# Patient Record
Sex: MALE | Race: WHITE | NOT HISPANIC OR LATINO | Employment: FULL TIME | URBAN - METROPOLITAN AREA
[De-identification: names, ages, dates, MRNs, and addresses within clinical notes are randomized per-mention and may not be internally consistent; named-entity substitution may affect disease eponyms.]

---

## 2018-11-13 ENCOUNTER — DOCUMENTATION ONLY (OUTPATIENT)
Dept: PEDIATRIC HEMATOLOGY/ONCOLOGY | Facility: CLINIC | Age: 28
End: 2018-11-13

## 2018-11-19 ENCOUNTER — DOCUMENTATION ONLY (OUTPATIENT)
Dept: PEDIATRIC HEMATOLOGY/ONCOLOGY | Facility: CLINIC | Age: 28
End: 2018-11-19

## 2019-01-02 ENCOUNTER — LAB VISIT (OUTPATIENT)
Dept: LAB | Facility: HOSPITAL | Age: 29
End: 2019-01-02
Attending: PEDIATRICS
Payer: COMMERCIAL

## 2019-01-02 ENCOUNTER — OFFICE VISIT (OUTPATIENT)
Dept: PEDIATRIC HEMATOLOGY/ONCOLOGY | Facility: CLINIC | Age: 29
End: 2019-01-02
Payer: COMMERCIAL

## 2019-01-02 VITALS
DIASTOLIC BLOOD PRESSURE: 71 MMHG | SYSTOLIC BLOOD PRESSURE: 116 MMHG | HEIGHT: 71 IN | RESPIRATION RATE: 18 BRPM | HEART RATE: 75 BPM | BODY MASS INDEX: 25.65 KG/M2 | TEMPERATURE: 98 F | WEIGHT: 183.19 LBS

## 2019-01-02 DIAGNOSIS — Z71.3 NUTRITIONAL COUNSELING: ICD-10-CM

## 2019-01-02 DIAGNOSIS — Z85.9 HISTORY OF CANCER: ICD-10-CM

## 2019-01-02 DIAGNOSIS — Z92.21 HISTORY OF CHEMOTHERAPY: ICD-10-CM

## 2019-01-02 DIAGNOSIS — Z92.21 HISTORY OF CHEMOTHERAPY: Primary | ICD-10-CM

## 2019-01-02 LAB
ALBUMIN SERPL BCP-MCNC: 4.4 G/DL
ALBUMIN/CREAT UR: 13.5 UG/MG
ALP SERPL-CCNC: 96 U/L
ALT SERPL W/O P-5'-P-CCNC: 24 U/L
AMORPH CRY UR QL COMP ASSIST: NORMAL
ANION GAP SERPL CALC-SCNC: 7 MMOL/L
AST SERPL-CCNC: 60 U/L
BASOPHILS # BLD AUTO: 0.02 K/UL
BASOPHILS NFR BLD: 0.3 %
BILIRUB SERPL-MCNC: 0.5 MG/DL
BILIRUB UR QL STRIP: NEGATIVE
BUN SERPL-MCNC: 19 MG/DL
CALCIUM SERPL-MCNC: 9.5 MG/DL
CHLORIDE SERPL-SCNC: 104 MMOL/L
CHOLEST SERPL-MCNC: 249 MG/DL
CHOLEST/HDLC SERPL: 4.8 {RATIO}
CLARITY UR REFRACT.AUTO: ABNORMAL
CO2 SERPL-SCNC: 28 MMOL/L
COLOR UR AUTO: YELLOW
CREAT SERPL-MCNC: 1.1 MG/DL
CREAT UR-MCNC: 208 MG/DL
DIFFERENTIAL METHOD: NORMAL
EOSINOPHIL # BLD AUTO: 0.1 K/UL
EOSINOPHIL NFR BLD: 0.9 %
ERYTHROCYTE [DISTWIDTH] IN BLOOD BY AUTOMATED COUNT: 13.2 %
EST. GFR  (AFRICAN AMERICAN): >60 ML/MIN/1.73 M^2
EST. GFR  (NON AFRICAN AMERICAN): >60 ML/MIN/1.73 M^2
ESTIMATED AVG GLUCOSE: 91 MG/DL
GLUCOSE SERPL-MCNC: 85 MG/DL
GLUCOSE UR QL STRIP: NEGATIVE
HBA1C MFR BLD HPLC: 4.8 %
HCT VFR BLD AUTO: 43.1 %
HDLC SERPL-MCNC: 52 MG/DL
HDLC SERPL: 20.9 %
HGB BLD-MCNC: 14.6 G/DL
HGB UR QL STRIP: NEGATIVE
KETONES UR QL STRIP: NEGATIVE
LDLC SERPL CALC-MCNC: 131.6 MG/DL
LEUKOCYTE ESTERASE UR QL STRIP: NEGATIVE
LYMPHOCYTES # BLD AUTO: 1.6 K/UL
LYMPHOCYTES NFR BLD: 20.5 %
MCH RBC QN AUTO: 30.9 PG
MCHC RBC AUTO-ENTMCNC: 33.9 G/DL
MCV RBC AUTO: 91 FL
MICROALBUMIN UR DL<=1MG/L-MCNC: 28 UG/ML
MICROSCOPIC COMMENT: NORMAL
MONOCYTES # BLD AUTO: 0.6 K/UL
MONOCYTES NFR BLD: 7.3 %
NEUTROPHILS # BLD AUTO: 5.4 K/UL
NEUTROPHILS NFR BLD: 71 %
NITRITE UR QL STRIP: NEGATIVE
NONHDLC SERPL-MCNC: 197 MG/DL
PH UR STRIP: 5 [PH] (ref 5–8)
PLATELET # BLD AUTO: 235 K/UL
PMV BLD AUTO: 11.7 FL
POTASSIUM SERPL-SCNC: 4.1 MMOL/L
PROT SERPL-MCNC: 8.1 G/DL
PROT UR QL STRIP: NEGATIVE
RBC # BLD AUTO: 4.72 M/UL
SODIUM SERPL-SCNC: 139 MMOL/L
SP GR UR STRIP: 1.02 (ref 1–1.03)
TRIGL SERPL-MCNC: 327 MG/DL
TSH SERPL DL<=0.005 MIU/L-ACNC: 1.71 UIU/ML
URN SPEC COLLECT METH UR: ABNORMAL
WBC # BLD AUTO: 7.65 K/UL

## 2019-01-02 PROCEDURE — 99499 UNLISTED E&M SERVICE: CPT | Mod: S$GLB,,, | Performed by: PEDIATRICS

## 2019-01-02 PROCEDURE — 99499 NO LOS: ICD-10-PCS | Mod: S$GLB,,, | Performed by: PEDIATRICS

## 2019-01-02 PROCEDURE — 99999 PR PBB SHADOW E&M-EST. PATIENT-LVL III: ICD-10-PCS | Mod: PBBFAC,,,

## 2019-01-02 PROCEDURE — 99203 OFFICE O/P NEW LOW 30 MIN: CPT | Mod: S$GLB,,, | Performed by: PEDIATRICS

## 2019-01-02 PROCEDURE — 82043 UR ALBUMIN QUANTITATIVE: CPT

## 2019-01-02 PROCEDURE — 80053 COMPREHEN METABOLIC PANEL: CPT

## 2019-01-02 PROCEDURE — 85025 COMPLETE CBC W/AUTO DIFF WBC: CPT | Mod: PO

## 2019-01-02 PROCEDURE — 99203 PR OFFICE/OUTPT VISIT, NEW, LEVL III, 30-44 MIN: ICD-10-PCS | Mod: S$GLB,,, | Performed by: PEDIATRICS

## 2019-01-02 PROCEDURE — 81001 URINALYSIS AUTO W/SCOPE: CPT

## 2019-01-02 PROCEDURE — 36415 COLL VENOUS BLD VENIPUNCTURE: CPT | Mod: PO

## 2019-01-02 PROCEDURE — 83036 HEMOGLOBIN GLYCOSYLATED A1C: CPT

## 2019-01-02 PROCEDURE — 99999 PR PBB SHADOW E&M-EST. PATIENT-LVL III: CPT | Mod: PBBFAC,,,

## 2019-01-02 PROCEDURE — 97802 PR MED NUTR THER, 1ST, INDIV, EA 15 MIN: ICD-10-PCS | Mod: S$GLB,,, | Performed by: DIETITIAN, REGISTERED

## 2019-01-02 PROCEDURE — 80061 LIPID PANEL: CPT

## 2019-01-02 PROCEDURE — 86703 HIV-1/HIV-2 1 RESULT ANTBDY: CPT

## 2019-01-02 PROCEDURE — 97802 MEDICAL NUTRITION INDIV IN: CPT | Mod: S$GLB,,, | Performed by: DIETITIAN, REGISTERED

## 2019-01-02 PROCEDURE — 84443 ASSAY THYROID STIM HORMONE: CPT

## 2019-01-02 NOTE — PROGRESS NOTES
"Referring Physician:No ref. provider found     Reason for visit:  Chief Complaint   Patient presents with    Nutrition Counseling        :1990     Allergies Reviewed  Meds Reviewed    Anthropometrics  Weight:81.6 kg (180 lb)  Height:5' 11" (1.803 m)  BMI:Body mass index is 25.1 kg/m².   IBW: 172#    Meds:  Outpatient Medications Prior to Visit   Medication Sig Dispense Refill    sertraline (ZOLOFT) 100 MG tablet Take 100 mg by mouth once daily.      hyoscyamine (LEVSIN/SL) 0.125 mg Subl Take 1 tablet (0.125 mg total) by mouth every 4 (four) hours as needed. Take before meals 60 tablet 6     No facility-administered medications prior to visit.          Labs: Labs ordered for today     Estimated Nutrition Needs: 2000 Kcals/day (25 kcal/kg), 80 g protein (1.0 g/kg)     Diet Hx: Pt here for nutrition counseling in [place]. Current weight of 180# which is stable. Pt expresses desire to reach 175#. Good appetite; eats 3 meals/day with no snacks. Most meals mainly consist of some form of protein/meat. Pt likes fruits/vegetables but rarely eats them. Pt currently in law school; lives with otilio in [facility]. Picks up food at most meals; cooks at home about 3 nights/week. Complains of diarrhea occasionally, especially after greasy foods. Wants to work on increasing exercise.      Breakfast: thinkthin bar or kind bar  Lunch: sandwich with chips  Dinner: meat and starch, occasionally vegetable    Assessment: Encouraged pt to portion plate to fill 1/2 with fruits and vegetables, 1/4 with lean protein, and 1/4 with whole grains/starches. Discussed ways to incorporate fruits and vegetables into day. Encouraged pt to increase meals prepared at home and make healthy choices when eating away from home. Answered questions about sugar intake. Explained that added sugar intake should be kept to a minimal. Pt states he doesn't have a sweet tooth and doesn't drink regular sodas often. Explained that excess added sugar is " typically in the form of unhealthy snacks, regular sodas/juices, and sweets. Discussed ways to increase exercise. Reviewed medications, supplement/herbal intake and no food/med interactions noted. Compliance is good. Comprehension is good.    Nutrition Diagnosis: excess energy intake related to food and nutrition related knowledge deficit as evidenced by frequent consumption of foods picked up away from home with limited fruit and vegetable intake    Recommendations: Recommend eating at least 1 fruit and 1 vegetable every day. Recommend cooking at home 4-5 nights/week rather than 3. Recommend exercise for at least 30 minutes 5 days/week.       Consultation Time:30 minutes.    Follow Up: PRN

## 2019-01-03 LAB — HIV 1+2 AB+HIV1 P24 AG SERPL QL IA: NEGATIVE

## 2019-01-03 NOTE — PROGRESS NOTES
[facility] Pediatric Oncology Note    Subjective:       Patient ID: Ronald Thompson is a 28 y.o. male      Chief Complaint:    Chief Complaint   Patient presents with    Nutrition Counseling    Comprehensive Cancer Survivor Care         History of Present Illness:   Ronald Thompson is a 28 y.o. male with a remote history of Stage 1 Favorable histology Wilms tumor here today for Comprehensive Cancer Survivor Care.  Review of his records reveals that Ronald was diagnosed with Wilms tumor at the age of 4 and was treated with right nephrectomy and chemotherapy following POG 8650 (vincristine and Actinomycin D).  He reports that he has no significant current health issues or concerns.  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      Past Medical History:   Diagnosis Date    Anxiety     Wilms' tumor      Past Surgical History:   Procedure Laterality Date    NEPHRECTOMY       FH:  Paternal grandmother had breast cancer.  No other cancers in immediate family members    Social History     Socioeconomic History    Marital status: Single     Spouse name: Not on file    Number of children: Not on file    Years of education: Not on file    Highest education level: Not on file   Social Needs    Financial resource strain: Not on file    Food insecurity - worry: Not on file    Food insecurity - inability: Not on file    Transportation needs - medical: Not on file    Transportation needs - non-medical: Not on file   Occupational History    Not on file   Tobacco Use    Smoking status: Never Smoker    Smokeless tobacco: Never Used   Substance and Sexual Activity    Alcohol use: Yes     Comment: ~ 5 drinks/week    Drug use: No    Sexual activity: Yes     Partners: Female   Other Topics Concern    Not on file   Social History Narrative    Currently living in [facility].  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  Engaged.  Exercises once/week.        Current Outpatient Medications on File Prior to Visit   Medication Sig Dispense Refill    sertraline (ZOLOFT) 100 MG tablet Take 100 mg by mouth once daily.       No current facility-administered medications on file prior to visit.        ROS:   Gen: Negative for fever. Negative for night sweats. Negative for recent weight loss.    HEENT: Negative for frequent nosebleeds.  Negative for sore throat.  Negative for visual problems.  Pulm: Negative for cough.  Negative for shortness of breath.  CV: Negative for chest pain.  Negative for cyanosis.  GI: Negative for abdominal pain, nausea or vomiting, diarrhea or constipation.   : Negative for changes in frequency or dysuria.   Skin: Negative for bruising.  Negative for rash.    MS: Negative for joint swelling or pain.  Neuro:  Negative for seizures, frequent headaches or weakness/   Endocrine:  Negative for heat or cold intolerance.  Negative for increased thirst.  Psych: Negative for significant depressive symptoms. Negative for suicidal ideation. Positive for h/o anxiety.     Physical Examination:   Vitals:    01/02/19 1345   BP: 116/71   Pulse: 75   Resp: 18   Temp: 97.7 °F (36.5 °C)       General: well developed, well nourished, no distress  HENT: Head:normocephalic, atraumatic. Ears:bilateral TM's and external ear canals normal. Nose: Nares normal. No drainage or discharge. Throat: lips, mucosa, and tongue normal; teeth and gums normal and no throat erythema.  Eyes: conjunctivae/corneas clear. PERRL.   Neck: supple, symmetrical, and thyroid not enlarged, symmetric, no tenderness/mass/nodules  Lungs:  clear to auscultation bilaterally and normal respiratory effort  Cardiovascular: regular rate and rhythm, S1, S2 normal, no murmur.  Extremities: no cyanosis or edema, or clubbing. Pulses: 2+ and symmetric.  Abdomen: soft, non-tender non-distented; bowel sounds normal; no masses, no organomegaly.   Genitalia: penis: no lesions or discharge. Testes: no masses or  tenderness.   Skin: Skin color, texture, turgor normal. No rashes or lesions  Musculoskeletal: No obvious joint swelling or erythema.  Lymph Nodes: No cervical, supraclavicular, axillary or inguinal adenopathy  Neurologic: Cranial nerves intact.  Normal strength and tone. No focal numbness or weakness  Psych: appropriate mood and affect    Objective:       Labs:   Lab Results   Component Value Date    WBC 7.65 01/02/2019    HGB 14.6 01/02/2019    HCT 43.1 01/02/2019    MCV 91 01/02/2019     01/02/2019         Chemistry        Component Value Date/Time     01/02/2019 1527    K 4.1 01/02/2019 1527     01/02/2019 1527    CO2 28 01/02/2019 1527    BUN 19 01/02/2019 1527    CREATININE 1.1 01/02/2019 1527    GLU 85 01/02/2019 1527        Component Value Date/Time    CALCIUM 9.5 01/02/2019 1527    ALKPHOS 96 01/02/2019 1527    AST 60 (H) 01/02/2019 1527    ALT 24 01/02/2019 1527    BILITOT 0.5 01/02/2019 1527    ESTGFRAFRICA >60.0 01/02/2019 1527    EGFRNONAA >60.0 01/02/2019 1527        Hb A1C 4.8  TSH 1.7    HIV negative    Chol 249  HDL 52    TGs 327    UA WNL  Microalbumin/creatinine ratio  13.5        Assessment/Plan:   Ronald was seen today for nutrition counseling and comprehensive cancer survivor care.    Diagnoses and all orders for this visit:    History of chemotherapy  -     CBC W/ AUTO DIFFERENTIAL; Future  -     Comprehensive metabolic panel; Future  -     HIV 1/2 Ag/Ab (4th Gen); Future  -     Lipid panel; Future  -     HEMOGLOBIN A1C; Future  -     TSH; Future  -     Urinalysis  -     Microalbumin/creatinine urine ratio    Nutritional counseling    Other orders  -     Urinalysis Microscopic; Standing  -     Urinalysis Microscopic        Discussion:   28 year old young man with a remote history of localized favorable histology Wilms tumor treated with right nephrectomy and chemotherapy here today for Comprehensive Cancer Survivor Care.  He is currently living in [place] attending  [employer].  He reports no significant health issues    1) For his history of Wilms tumor and chemotherapy  - He received therapy following POG 8650 and received Actinomycin D (405 mcg/kg) and Vincristine (25 mg/m2)  - We discussed that the therapy that he received has a very good late effects profile and I expect no significant late issues from his chemotherapy  - We discussed that he has a single kidney. He asked if there was anything special he should do.  Advised that he should keep his weight down and monitor his BP as diabetes and    uncontrolled hypertension can result in kidney damage.  He asked about taking OTC pain medication and was told to avoid Tylenol and take ibuprofen.  Advised that he do the opposite and    use Tylenol rather than NSAIDs for minor pain.  - He had well thought out questions about his increased risk for other malignancies and the risk to any future children.  We discussed that his particular therapy is not associated with a     significant increase in secondary cancer or birth defects in offspring.  We also discussed that given that his Wilms tumor was not associated with any genetic syndromes, from what we     currently know, additional cancer risk for him or his future children should be minimal.    - CBC today WNL.  Chemistry normal other than slightly elevated AST (600 about which I am not particularly concerned.  If asymptomatic, will recheck in 1 year.  TSH normal.      2)  For his cancer survivor care  - recommended twice yearly dental visits, once yearly eye exams and annual skin cancer screens  - information was entered on [facility] and he was given account information  - follow yearly in [facility]    3) For health maintenance  - his HbA1C is good at 4.8  - Cholesterol and TGs were elevated but test was non-fasting                Electronically signed by Fermin Jansen Jr

## 2019-01-17 NOTE — PROGRESS NOTES
2019  RE: Ronald Thompson  1990 MRN [id number]    Ronald Thompson is a 28-year-old male survivor of a renal tumor at age [age].  He was treated with chemo and surgery at this young age and has very little memory of it.  He presented to the clinic by humself, neatly dressed and groomed, with full affect and very pleasant demeanor.  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  He is engaged to be  later this year.  He cites his fiancée, family, and friends as his support system, and otherwise enjoys golfing, hanging out with friends, and watching movies in his spare time.  His life has a great deal of stress due to his chosen career, and he has seen a therapist and been on Zoloft since [facility].  He is familiar and successful in managing his anxiety. Beyond normal stress, he does not endorse any further anxiety.  No follow up needed.

## 2019-01-17 NOTE — PROGRESS NOTES
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  Postural and walking analysis:   Ronald has a good postural and walking analysis  *Have patient walk if able, stand up, and sit down. Note any back curvature, hip rotation, neck protrusion, etc. Suggest home exercises to help correct posture, eliminate any spinal pressure, and strengthen the legs and back.     Limitations:   *Surgical implants, injuries, osteoporosis due to chemotherapy/steroids, anemia, low platelet counts et  none  *Many of our patients have received cardiotoxic chemo. Exercises that increase pressure in the chest cavity (i.e.Valsalva) should NOT be recommended such as squats, bench press, dead lift, rowing with a chest pad. Also, any lifts should be light to moderate resistance.     Personal trainers suggestions for activity:     To workout at home   *Suggest the best and safest activities and exercises for patients current physical abilities. Exertion is not the goal. Postural improvement, some weight loss if possible, increasing muscular strength, quality of life, and general normal movement are the main goal for these patients.   Additional Notes:           FUNCTIONAL MOVENT SCREEN SCORING SHEET  Test Score* Standard** Comments   Overhead Squat 2   pass    Shanique Step L 2 pass Right leg was shaking during the movement    R            2     In-line Lunge L 2 pass Difficult in balancing during the  movement    R 2     Shoulder Mobility L 2 pass Difficult in the top part of the movement    R 2     Active Straight-leg Stretch L 2 pass tightness in both hamstring      R 2     Trunk Stability: Plank L 2 pass     R 2     Rotational Stability: Bird/Dog L       R      Total: 12    Goals: To perform cardio 1 time a week and resistance training twice a week.    Recommendations: To exercise at home if Ronald watson make it to the gym               *Raw Score: refers to unilateral score (individual right or left side)    **Standard: The patient will receive a score of 0 if pain is associated with any portion of this test, a score of 1 if mechanics are below the standard for each test, a score of 2 is passing but can be improved, and a score of 3 if mechanics are at an optimal level.     Standards can be seen on the attached sheet.

## 2022-05-25 ENCOUNTER — TELEPHONE (OUTPATIENT)
Dept: INTERNAL MEDICINE | Facility: CLINIC | Age: 32
End: 2022-05-25
Payer: COMMERCIAL

## 2022-05-25 NOTE — TELEPHONE ENCOUNTER
Has mild URI sx and home tested for Covid yesterday and was positive. His wife is pregnant and they are masking/isolating. He has no fever or SOB only some mild cough and congestion. Distant PMH of Wilms tumor as child without recurrence. He is immunologically normal and has normal renal function. He is triple vaccinated with last vaccine in November 2021.  I recommended symptomatic rx only. Offered Paxlovid if he was anxious about not taking it or if sx progressed, but he is content to follow clinically  keily

## 2022-05-27 ENCOUNTER — PATIENT MESSAGE (OUTPATIENT)
Dept: NEPHROLOGY | Facility: HOSPITAL | Age: 32
End: 2022-05-27
Payer: COMMERCIAL

## 2022-06-01 ENCOUNTER — TELEPHONE (OUTPATIENT)
Dept: INTERNAL MEDICINE | Facility: CLINIC | Age: 32
End: 2022-06-01
Payer: COMMERCIAL

## 2022-06-01 NOTE — TELEPHONE ENCOUNTER
----- Message from Lara Cardenas sent at 5/31/2022  9:37 AM CDT -----  Regarding: covid question  Ronald Duncan left a message for you asking that you call him.  He said he wants to know how long he should quarantine.  He tested positive for covid last Tuesday.  Also wants to know if he is still testing positive does he still need to quarantine until he tests negative?    Thank you,  Lara      Left pt voice mail addressing above issues  kiely

## 2024-03-26 ENCOUNTER — OFFICE VISIT (OUTPATIENT)
Dept: INTERNAL MEDICINE | Facility: CLINIC | Age: 34
End: 2024-03-26
Payer: COMMERCIAL

## 2024-03-26 ENCOUNTER — LAB VISIT (OUTPATIENT)
Dept: LAB | Facility: HOSPITAL | Age: 34
End: 2024-03-26
Attending: INTERNAL MEDICINE
Payer: COMMERCIAL

## 2024-03-26 ENCOUNTER — PATIENT MESSAGE (OUTPATIENT)
Dept: INTERNAL MEDICINE | Facility: CLINIC | Age: 34
End: 2024-03-26

## 2024-03-26 VITALS
WEIGHT: 183.44 LBS | OXYGEN SATURATION: 98 % | HEART RATE: 86 BPM | BODY MASS INDEX: 25.68 KG/M2 | HEIGHT: 71 IN | DIASTOLIC BLOOD PRESSURE: 70 MMHG | SYSTOLIC BLOOD PRESSURE: 98 MMHG

## 2024-03-26 DIAGNOSIS — Z00.00 ROUTINE PHYSICAL EXAMINATION: ICD-10-CM

## 2024-03-26 DIAGNOSIS — D64.9 ANEMIA, UNSPECIFIED TYPE: Primary | ICD-10-CM

## 2024-03-26 DIAGNOSIS — Z00.00 ROUTINE PHYSICAL EXAMINATION: Primary | ICD-10-CM

## 2024-03-26 DIAGNOSIS — Z90.5 SINGLE KIDNEY: ICD-10-CM

## 2024-03-26 DIAGNOSIS — F41.9 ANXIETY: ICD-10-CM

## 2024-03-26 LAB
ALBUMIN SERPL BCP-MCNC: 4.4 G/DL (ref 3.5–5.2)
ALP SERPL-CCNC: 83 U/L (ref 55–135)
ALT SERPL W/O P-5'-P-CCNC: 43 U/L (ref 10–44)
ANION GAP SERPL CALC-SCNC: 7 MMOL/L (ref 8–16)
AST SERPL-CCNC: 37 U/L (ref 10–40)
BASOPHILS # BLD AUTO: 0.03 K/UL (ref 0–0.2)
BASOPHILS NFR BLD: 0.5 % (ref 0–1.9)
BILIRUB SERPL-MCNC: 0.6 MG/DL (ref 0.1–1)
BUN SERPL-MCNC: 17 MG/DL (ref 6–20)
CALCIUM SERPL-MCNC: 9.7 MG/DL (ref 8.7–10.5)
CHLORIDE SERPL-SCNC: 106 MMOL/L (ref 95–110)
CHOLEST SERPL-MCNC: 221 MG/DL (ref 120–199)
CHOLEST/HDLC SERPL: 5.5 {RATIO} (ref 2–5)
CO2 SERPL-SCNC: 25 MMOL/L (ref 23–29)
CREAT SERPL-MCNC: 1 MG/DL (ref 0.5–1.4)
DIFFERENTIAL METHOD BLD: ABNORMAL
EOSINOPHIL # BLD AUTO: 0.1 K/UL (ref 0–0.5)
EOSINOPHIL NFR BLD: 1.3 % (ref 0–8)
ERYTHROCYTE [DISTWIDTH] IN BLOOD BY AUTOMATED COUNT: 12.7 % (ref 11.5–14.5)
EST. GFR  (NO RACE VARIABLE): >60 ML/MIN/1.73 M^2
ESTIMATED AVG GLUCOSE: 94 MG/DL (ref 68–131)
GLUCOSE SERPL-MCNC: 75 MG/DL (ref 70–110)
HBA1C MFR BLD: 4.9 % (ref 4–5.6)
HCT VFR BLD AUTO: 42 % (ref 40–54)
HCV AB SERPL QL IA: NORMAL
HDLC SERPL-MCNC: 40 MG/DL (ref 40–75)
HDLC SERPL: 18.1 % (ref 20–50)
HGB BLD-MCNC: 13.8 G/DL (ref 14–18)
IMM GRANULOCYTES # BLD AUTO: 0.01 K/UL (ref 0–0.04)
IMM GRANULOCYTES NFR BLD AUTO: 0.2 % (ref 0–0.5)
LDLC SERPL CALC-MCNC: 165 MG/DL (ref 63–159)
LYMPHOCYTES # BLD AUTO: 2 K/UL (ref 1–4.8)
LYMPHOCYTES NFR BLD: 32.9 % (ref 18–48)
MCH RBC QN AUTO: 30.7 PG (ref 27–31)
MCHC RBC AUTO-ENTMCNC: 32.9 G/DL (ref 32–36)
MCV RBC AUTO: 93 FL (ref 82–98)
MONOCYTES # BLD AUTO: 0.5 K/UL (ref 0.3–1)
MONOCYTES NFR BLD: 8.3 % (ref 4–15)
NEUTROPHILS # BLD AUTO: 3.4 K/UL (ref 1.8–7.7)
NEUTROPHILS NFR BLD: 56.8 % (ref 38–73)
NONHDLC SERPL-MCNC: 181 MG/DL
NRBC BLD-RTO: 0 /100 WBC
PLATELET # BLD AUTO: 229 K/UL (ref 150–450)
PMV BLD AUTO: 11.7 FL (ref 9.2–12.9)
POTASSIUM SERPL-SCNC: 4.6 MMOL/L (ref 3.5–5.1)
PROT SERPL-MCNC: 7.3 G/DL (ref 6–8.4)
RBC # BLD AUTO: 4.5 M/UL (ref 4.6–6.2)
SODIUM SERPL-SCNC: 138 MMOL/L (ref 136–145)
TRIGL SERPL-MCNC: 80 MG/DL (ref 30–150)
TSH SERPL DL<=0.005 MIU/L-ACNC: 1.57 UIU/ML (ref 0.4–4)
WBC # BLD AUTO: 6.02 K/UL (ref 3.9–12.7)

## 2024-03-26 PROCEDURE — 80061 LIPID PANEL: CPT | Performed by: INTERNAL MEDICINE

## 2024-03-26 PROCEDURE — 3078F DIAST BP <80 MM HG: CPT | Mod: CPTII,S$GLB,, | Performed by: INTERNAL MEDICINE

## 2024-03-26 PROCEDURE — 3008F BODY MASS INDEX DOCD: CPT | Mod: CPTII,S$GLB,, | Performed by: INTERNAL MEDICINE

## 2024-03-26 PROCEDURE — 99999 PR PBB SHADOW E&M-EST. PATIENT-LVL III: CPT | Mod: PBBFAC,,, | Performed by: INTERNAL MEDICINE

## 2024-03-26 PROCEDURE — 1160F RVW MEDS BY RX/DR IN RCRD: CPT | Mod: CPTII,S$GLB,, | Performed by: INTERNAL MEDICINE

## 2024-03-26 PROCEDURE — 3074F SYST BP LT 130 MM HG: CPT | Mod: CPTII,S$GLB,, | Performed by: INTERNAL MEDICINE

## 2024-03-26 PROCEDURE — 85025 COMPLETE CBC W/AUTO DIFF WBC: CPT | Performed by: INTERNAL MEDICINE

## 2024-03-26 PROCEDURE — 1159F MED LIST DOCD IN RCRD: CPT | Mod: CPTII,S$GLB,, | Performed by: INTERNAL MEDICINE

## 2024-03-26 PROCEDURE — 80053 COMPREHEN METABOLIC PANEL: CPT | Performed by: INTERNAL MEDICINE

## 2024-03-26 PROCEDURE — 86803 HEPATITIS C AB TEST: CPT | Performed by: INTERNAL MEDICINE

## 2024-03-26 PROCEDURE — 99385 PREV VISIT NEW AGE 18-39: CPT | Mod: S$GLB,,, | Performed by: INTERNAL MEDICINE

## 2024-03-26 PROCEDURE — 84443 ASSAY THYROID STIM HORMONE: CPT | Performed by: INTERNAL MEDICINE

## 2024-03-26 PROCEDURE — 83036 HEMOGLOBIN GLYCOSYLATED A1C: CPT | Performed by: INTERNAL MEDICINE

## 2024-03-26 PROCEDURE — 36415 COLL VENOUS BLD VENIPUNCTURE: CPT | Performed by: INTERNAL MEDICINE

## 2024-03-26 NOTE — PROGRESS NOTES
Subjective:       Patient ID: Ronald Thompson is a 34 y.o. male.    Chief Complaint: Establish Care and Annual Exam    HPI: Pt is a 33 yo atty, new to me, prior [facility] pt. Here to est care and get a PE, labs. Wilms Tumor as a young child. Has not required follow up in years.  The only thing relatively new is elevated lipids in the last few years.  Mom with a history of hyperlipidemia.  We reviewed personal and family history.    2019 lipids were elevated and AST.  Repeating those he believes he had lipids while working in [place] the last 5 years and remembers they were high but does not remember the level  Not included in care everywhere.    He does have a solitary kidney and tries to avoid anti-inflammatories      Review of Systems   Constitutional:  Negative for chills, fatigue and fever.   HENT:  Negative for nosebleeds and trouble swallowing.    Eyes:  Negative for pain and visual disturbance.   Respiratory:  Negative for cough, shortness of breath and wheezing.    Cardiovascular:  Negative for chest pain and palpitations.   Gastrointestinal:  Negative for abdominal pain, constipation, diarrhea, nausea and vomiting.   Genitourinary:  Negative for difficulty urinating and hematuria.   Musculoskeletal:  Negative for arthralgias, back pain and neck pain.   Integumentary:  Negative for rash.   Neurological:  Negative for dizziness and headaches.   Hematological:  Does not bruise/bleed easily.   Psychiatric/Behavioral:  Negative for dysphoric mood and sleep disturbance.            Past Medical History:   Diagnosis Date    Anxiety     Left wrist fracture     Wilms' tumor      Past Surgical History:   Procedure Laterality Date    NEPHRECTOMY Right       Patient Active Problem List   Diagnosis    IBS (irritable bowel syndrome)    Anxiety        Objective:      Physical Exam  Constitutional:       General: He is not in acute distress.     Appearance: He is well-developed.   HENT:      Head: Normocephalic and  atraumatic.      Right Ear: Tympanic membrane, ear canal and external ear normal.      Left Ear: Tympanic membrane, ear canal and external ear normal.      Mouth/Throat:      Pharynx: No oropharyngeal exudate or posterior oropharyngeal erythema.   Eyes:      General: No scleral icterus.     Conjunctiva/sclera: Conjunctivae normal.      Pupils: Pupils are equal, round, and reactive to light.   Neck:      Thyroid: No thyromegaly.      Comments: No supraclavicular nodes palpated  Cardiovascular:      Rate and Rhythm: Normal rate and regular rhythm.      Pulses: Normal pulses.      Heart sounds: Normal heart sounds. No murmur heard.  Pulmonary:      Effort: Pulmonary effort is normal.      Breath sounds: Normal breath sounds. No wheezing.   Abdominal:      General: Bowel sounds are normal.      Palpations: Abdomen is soft. There is no mass.      Tenderness: There is no abdominal tenderness.   Musculoskeletal:         General: No tenderness.      Cervical back: Normal range of motion and neck supple.      Right lower leg: No edema.      Left lower leg: No edema.   Lymphadenopathy:      Cervical: No cervical adenopathy.   Skin:     Coloration: Skin is not jaundiced or pale.   Neurological:      General: No focal deficit present.      Mental Status: He is alert and oriented to person, place, and time.   Psychiatric:         Mood and Affect: Mood normal.         Behavior: Behavior normal.         Assessment:       Problem List Items Addressed This Visit          Psychiatric    Anxiety     Other Visit Diagnoses       Routine physical examination    -  Primary    Relevant Orders    Lipid Panel    TSH    CBC Auto Differential    Comprehensive Metabolic Panel    Hemoglobin A1C    HEPATITIS C ANTIBODY    Single kidney                Plan:         Ronald was seen today for establish care and annual exam.    Diagnoses and all orders for this visit:    Routine physical examination  -     Lipid Panel; Future  -     TSH; Future  -      "CBC Auto Differential; Future  -     Comprehensive Metabolic Panel; Future  -     Hemoglobin A1C; Future  -     HEPATITIS C ANTIBODY; Future    Anxiety    Single kidney       Review all studies, follow-up after above and annually              Portions of this note may have been created with voice recognition software. Occasional "wrong-word" or "sound-a-like" substitutions may have occurred due to the inherent limitations of voice recognition software. Please, read the note carefully and recognize, using context, where substitutions have occurred.  "

## 2024-04-02 NOTE — TELEPHONE ENCOUNTER
Pt inquiring about high cholesterol     Abnormal cholesterol results as below (taken from Lipid Panel 3/26/24)    Cholesterol 120 - 199 mg/dL 221 High      LDL Cholesterol 63.0 - 159.0 mg/dL 165.0 High      HDL/Cholesterol Ratio 20.0 - 50.0 % 18.1 Low      Total Cholesterol/HDL Ratio 2.0 - 5.0 5.5 High